# Patient Record
Sex: FEMALE | Race: WHITE | NOT HISPANIC OR LATINO | Employment: STUDENT | URBAN - METROPOLITAN AREA
[De-identification: names, ages, dates, MRNs, and addresses within clinical notes are randomized per-mention and may not be internally consistent; named-entity substitution may affect disease eponyms.]

---
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
## 2020-02-26 NOTE — PROGRESS NOTES
"  Physical Therapy Daily Treatment Note     Name: Vidya Harley  Clinic Number: [id number]    Therapy Diagnosis:   Encounter Diagnoses   Name Primary?    Pain and swelling of left knee     Quadriceps weakness      Physician: Meng Acuna MD    Visit Date: 1/4/2019  Physician Orders: PT Eval and Treat   Medical Diagnosis from Referral: chronic pain left knee; plica syndrome left knee, chondromalacia patellae left knee  S/P arthroscopy of left knee  Evaluation Date: 12/7/2018  Authorization Period Expiration: 12/31/18  Plan of Care Expiration: 2/1/19  Visit # / Visits authorized: 6/ 20      Time In: 8:00 AM  Time Out: 9:00 AM  Total Billable Time: 55 minutes    Precautions: Standard    Subjective     Pt reports: that she is w/o complaint today  No pain  She was compliant with home exercise program.  Response to previous treatment: no pain  Functional change: Jogging 1/2 mile without pain.    Pain: 0/10  Location: left knee      Objective     Vidya received therapeutic exercises to develop strength, endurance, ROM and flexibility for 40 minutes including:     Hamstring stretch 3x 30 seconds  Quad sets 20x NP  SLRs 2 lb 3/10 NP  LAQs 2lb 3/10 NP  Lateral step downs 6", x20  Lunges 25 ft x4 rotation with yellow ball  Wall sits 3x 30sec ea  Stationary bike level 3, 10 min, seat 6  Leg press 50 lb, 3/10, seat 6  Lateral step squat with green band 25ft x 4  Shuttle jumps 1 black band x20  Treadmill 3.6 MPH 1/2 mile  SLS ball toss on trampoline 30 each way     Pt received grade II patella/jt mobs x 10 min to L knee  Vidya received cold pack for 10 minutes to left knee.      Home Exercises Provided and Patient Education Provided     Education provided:   - Pt instructed to continue with quad strengthening exercise and ice.    Written Home Exercises Provided: Patient instructed to cont prior HEP.  Exercises were reviewed and Vidya was able to demonstrate them prior to the end of the session.  Vidya " demonstrated good  understanding of the education provided.     See EMR under Patient Instructions for exercises provided prior visit.    Assessment   Pt marky tx with progression of ther ex well, improved patella mobility with reps with mobs.   Full ROM left knee. 5/5 quad strength.  Walking no longer restricted by pain.  Able to jog 1/2 mile without pain.  Vidya is progressing well towards her goals.   Pt prognosis is Excellent.     Pt will continue to benefit from skilled outpatient physical therapy to address the deficits listed in the problem list box on initial evaluation, provide pt/family education and to maximize pt's level of independence in the home and community environment.     Pt's spiritual, cultural and educational needs considered and pt agreeable to plan of care and goals.    Anticipated barriers to physical therapy: none    Goals:   Short Term Goals: 4 weeks   Pt will have full ROM without pain left knee to allow for unrestricted walking. MET 12/12/18  Pt will have 4+/5 quad strength and good quad tone for unlimited walking. MET 12/19/18  Pt will have no swelling of left knee. MET 1/2/19     Long Term Goals: 8 weeks   Pt will be able to walk 1 mile without sx.MET 12/19/18  Pt will be able to run 1 mile without sx. PROGRESSING    Plan     Cont per POC with focus on quad strengthening and hamstring stretching.    Tj Gabriel, PTA   Crescentic Advancement Flap Text: The defect edges were debeveled with a #15 scalpel blade.  Given the location of the defect and the proximity to free margins a crescentic advancement flap was deemed most appropriate.  Using a sterile surgical marker, the appropriate advancement flap was drawn incorporating the defect and placing the expected incisions within the relaxed skin tension lines where possible.    The area thus outlined was incised deep to adipose tissue with a #15 scalpel blade.  The skin margins were undermined to an appropriate distance in all directions utilizing iris scissors.

## 2020-06-15 DIAGNOSIS — Z30.46 ENCOUNTER FOR SURVEILLANCE OF IMPLANTABLE SUBDERMAL CONTRACEPTIVE: Primary | ICD-10-CM

## 2020-07-15 ENCOUNTER — OFFICE VISIT (OUTPATIENT)
Dept: OBSTETRICS AND GYNECOLOGY | Facility: CLINIC | Age: 20
End: 2020-07-15
Payer: COMMERCIAL

## 2020-07-15 VITALS
DIASTOLIC BLOOD PRESSURE: 60 MMHG | WEIGHT: 186.31 LBS | SYSTOLIC BLOOD PRESSURE: 110 MMHG | BODY MASS INDEX: 36.58 KG/M2 | HEIGHT: 60 IN

## 2020-07-15 DIAGNOSIS — Z30.46 NEXPLANON REMOVAL: ICD-10-CM

## 2020-07-15 DIAGNOSIS — Z01.812 PRE-PROCEDURE LAB EXAM: Primary | ICD-10-CM

## 2020-07-15 DIAGNOSIS — Z30.017 NEXPLANON INSERTION: ICD-10-CM

## 2020-07-15 LAB
B-HCG UR QL: NEGATIVE
CTP QC/QA: YES

## 2020-07-15 PROCEDURE — 99999 PR PBB SHADOW E&M-EST. PATIENT-LVL III: ICD-10-PCS | Mod: PBBFAC,,, | Performed by: SPECIALIST

## 2020-07-15 PROCEDURE — 11983 REMOVE/INSERT DRUG IMPLANT: CPT | Mod: S$GLB,,, | Performed by: SPECIALIST

## 2020-07-15 PROCEDURE — 99999 PR PBB SHADOW E&M-EST. PATIENT-LVL III: CPT | Mod: PBBFAC,,, | Performed by: SPECIALIST

## 2020-07-15 PROCEDURE — 3008F BODY MASS INDEX DOCD: CPT | Mod: CPTII,S$GLB,, | Performed by: SPECIALIST

## 2020-07-15 PROCEDURE — 11983 PR REMOVAL W/ REINSERT DRUG IMPLANT DEVICE: ICD-10-PCS | Mod: S$GLB,,, | Performed by: SPECIALIST

## 2020-07-15 PROCEDURE — 3008F PR BODY MASS INDEX (BMI) DOCUMENTED: ICD-10-PCS | Mod: CPTII,S$GLB,, | Performed by: SPECIALIST

## 2020-07-15 NOTE — PROCEDURES
Procedures       Procedure  Nexplanon Removal     After informed consent, betadine prep over implant site followed by 1% Lidocaine administered subQ over implant site. Small incision made over implant and mosquito hemostats used to grasp and remove implant w/o difficulty.        Procedure  Nexplanon Placement    After proper informed conscent, nondominant upper inner arm prepped with Betadine solution, in sterile fashion. 1% Lidocaine administered sub-Q along placement path.  Nexplanon delivery needle placed under skin, advanced to hub base and delivery lever depressed with implant delivered without difficulty.  Implant palpated under skin and solitary 4-0 suture placed and compression bandage applied. Tolerated well.   Pt to RTO 7 days for suture removal

## 2020-07-15 NOTE — PROGRESS NOTES
19 yo WF presents for  Nexplanon removal and replacement.  UPT neg    Past Medical History:   Diagnosis Date    Migraine        Past Surgical History:   Procedure Laterality Date    ARTHROSCOPY OF KNEE Left 2018    Procedure: ARTHROSCOPY, KNEE;  Surgeon: Meng Acuna MD;  Location: [facility] OR;  Service: Orthopedics;  Laterality: Left;    KNEE ARTHROSCOPY W/ PLICA EXCISION Left 2018    Procedure: EXCISION, PLICA, KNEE, ARTHROSCOPIC;  Surgeon: Meng Acuna MD;  Location: [facility] OR;  Service: Orthopedics;  Laterality: Left;    THUMB SURGERY Right        Family History   Problem Relation Age of Onset    No Known Problems Mother     No Known Problems Father     Breast cancer Neg Hx        Social History     Socioeconomic History    Marital status: Single     Spouse name: Not on file    Number of children: Not on file    Years of education: Not on file    Highest education level: Not on file   Occupational History    Not on file   Social Needs    Financial resource strain: Not on file    Food insecurity     Worry: Not on file     Inability: Not on file    Transportation needs     Medical: Not on file     Non-medical: Not on file   Tobacco Use    Smoking status: Never Smoker    Smokeless tobacco: Never Used   Substance and Sexual Activity    Alcohol use: No    Drug use: No    Sexual activity: Never     Birth control/protection: Implant   Lifestyle    Physical activity     Days per week: Not on file     Minutes per session: Not on file    Stress: Not on file   Relationships    Social connections     Talks on phone: Not on file     Gets together: Not on file     Attends [employer] service: Not on file     Active member of club or organization: Not on file     Attends meetings of clubs or organizations: Not on file     Relationship status: Not on file   Other Topics Concern    Not on file   Social History Narrative    Not on file       Current Outpatient Medications    Medication Sig Dispense Refill    etonogestrel (NEXPLANON) 68 mg Impl 68 mg by Subdermal route.       Current Facility-Administered Medications   Medication Dose Route Frequency Provider Last Rate Last Dose    etonogestrel Impl   Subdermal Once Jerome Otto MD           Review of patient's allergies indicates:   Allergen Reactions    Penicillins Hives    Zithromax [azithromycin] Hives       Review of System:   General: no chills, fever, night sweats, weight gain or weight loss  Psychological: no depression or suicidal ideation  Breasts: no new or changing breast lumps, nipple discharge or masses.  Respiratory: no cough, shortness of breath, or wheezing  Cardiovascular: no chest pain or dyspnea on exertion  Gastrointestinal: no abdominal pain, change in bowel habits, or black or bloody stools  Genito-Urinary: no incontinence, urinary frequency/urgency or vulvar/vaginal symptoms, pelvic pain or abnormal vaginal bleeding.  Musculoskeletal: no gait disturbance or muscular weakness    Procedure  Nexplanon Removal     After informed consent, betadine prep over implant site followed by 1% Lidocaine administered subQ over implant site. Small incision made over implant and mosquito hemostats used to grasp and remove implant w/o difficulty.        Procedure  Nexplanon Placement    After proper informed conscent, nondominant upper inner arm prepped with Betadine solution, in sterile fashion. 1% Lidocaine administered sub-Q along placement path.  Nexplanon delivery needle placed under skin, advanced to hub base and delivery lever depressed with implant delivered without difficulty.  Implant palpated under skin and solitary 4-0 suture placed and compression bandage applied. Tolerated well.   Pt to RTO 7 days for suture removal  Product card given to pt.

## 2020-10-05 ENCOUNTER — PATIENT MESSAGE (OUTPATIENT)
Dept: ADMINISTRATIVE | Facility: HOSPITAL | Age: 20
End: 2020-10-05

## 2021-01-04 ENCOUNTER — PATIENT MESSAGE (OUTPATIENT)
Dept: ADMINISTRATIVE | Facility: HOSPITAL | Age: 21
End: 2021-01-04

## 2021-02-01 ENCOUNTER — HOSPITAL ENCOUNTER (OUTPATIENT)
Dept: RADIOLOGY | Facility: HOSPITAL | Age: 21
Discharge: HOME | End: 2021-02-01
Attending: PHYSICAL MEDICINE & REHABILITATION
Payer: COMMERCIAL

## 2021-02-01 ENCOUNTER — OFFICE VISIT (OUTPATIENT)
Dept: ORTHOPEDICS | Facility: CLINIC | Age: 21
End: 2021-02-01
Payer: COMMERCIAL

## 2021-02-01 DIAGNOSIS — M25.561 PAIN AND SWELLING OF KNEE, RIGHT: ICD-10-CM

## 2021-02-01 DIAGNOSIS — M25.569 KNEE PAIN, UNSPECIFIED CHRONICITY, UNSPECIFIED LATERALITY: Primary | ICD-10-CM

## 2021-02-01 DIAGNOSIS — S83.8X1A MENISCAL INJURY, RIGHT, INITIAL ENCOUNTER: ICD-10-CM

## 2021-02-01 DIAGNOSIS — M25.569 KNEE PAIN, UNSPECIFIED CHRONICITY, UNSPECIFIED LATERALITY: ICD-10-CM

## 2021-02-01 DIAGNOSIS — S83.006A PATELLAR DISLOCATION, INITIAL ENCOUNTER: Primary | ICD-10-CM

## 2021-02-01 DIAGNOSIS — M25.461 PAIN AND SWELLING OF KNEE, RIGHT: ICD-10-CM

## 2021-02-01 PROCEDURE — 99213 OFFICE O/P EST LOW 20 MIN: CPT | Mod: S$GLB,,, | Performed by: PHYSICAL MEDICINE & REHABILITATION

## 2021-02-01 PROCEDURE — 73562 X-RAY EXAM OF KNEE 3: CPT | Mod: 26,LT,, | Performed by: RADIOLOGY

## 2021-02-01 PROCEDURE — 73564 X-RAY EXAM KNEE 4 OR MORE: CPT | Mod: TC,RT

## 2021-02-01 PROCEDURE — 73564 XR KNEE ORTHO RIGHT WITH FLEXION: ICD-10-PCS | Mod: 26,RT,, | Performed by: RADIOLOGY

## 2021-02-01 PROCEDURE — 99999 PR PBB SHADOW E&M-EST. PATIENT-LVL III: ICD-10-PCS | Mod: PBBFAC,,, | Performed by: PHYSICAL MEDICINE & REHABILITATION

## 2021-02-01 PROCEDURE — 99213 PR OFFICE/OUTPT VISIT, EST, LEVL III, 20-29 MIN: ICD-10-PCS | Mod: S$GLB,,, | Performed by: PHYSICAL MEDICINE & REHABILITATION

## 2021-02-01 PROCEDURE — 73564 X-RAY EXAM KNEE 4 OR MORE: CPT | Mod: 26,RT,, | Performed by: RADIOLOGY

## 2021-02-01 PROCEDURE — 99999 PR PBB SHADOW E&M-EST. PATIENT-LVL III: CPT | Mod: PBBFAC,,, | Performed by: PHYSICAL MEDICINE & REHABILITATION

## 2021-02-01 PROCEDURE — 1125F AMNT PAIN NOTED PAIN PRSNT: CPT | Mod: S$GLB,,, | Performed by: PHYSICAL MEDICINE & REHABILITATION

## 2021-02-01 PROCEDURE — 1125F PR PAIN SEVERITY QUANTIFIED, PAIN PRESENT: ICD-10-PCS | Mod: S$GLB,,, | Performed by: PHYSICAL MEDICINE & REHABILITATION

## 2021-02-01 PROCEDURE — 73562 XR KNEE ORTHO RIGHT WITH FLEXION: ICD-10-PCS | Mod: 26,LT,, | Performed by: RADIOLOGY

## 2021-02-08 ENCOUNTER — HOSPITAL ENCOUNTER (OUTPATIENT)
Dept: RADIOLOGY | Facility: HOSPITAL | Age: 21
Discharge: HOME | End: 2021-02-08
Attending: PHYSICAL MEDICINE & REHABILITATION
Payer: COMMERCIAL

## 2021-02-08 DIAGNOSIS — S83.8X1A MENISCAL INJURY, RIGHT, INITIAL ENCOUNTER: ICD-10-CM

## 2021-02-08 DIAGNOSIS — S83.006A PATELLAR DISLOCATION, INITIAL ENCOUNTER: ICD-10-CM

## 2021-02-08 DIAGNOSIS — M25.461 PAIN AND SWELLING OF KNEE, RIGHT: ICD-10-CM

## 2021-02-08 DIAGNOSIS — M25.561 PAIN AND SWELLING OF KNEE, RIGHT: ICD-10-CM

## 2021-02-08 PROCEDURE — 73721 MRI KNEE WITHOUT CONTRAST RIGHT: ICD-10-PCS | Mod: 26,RT,, | Performed by: RADIOLOGY

## 2021-02-08 PROCEDURE — 73721 MRI JNT OF LWR EXTRE W/O DYE: CPT | Mod: TC,RT

## 2021-02-08 PROCEDURE — 73721 MRI JNT OF LWR EXTRE W/O DYE: CPT | Mod: 26,RT,, | Performed by: RADIOLOGY

## 2021-02-09 ENCOUNTER — PATIENT MESSAGE (OUTPATIENT)
Dept: ORTHOPEDICS | Facility: CLINIC | Age: 21
End: 2021-02-09

## 2021-02-11 DIAGNOSIS — S83.006A PATELLAR DISLOCATION, INITIAL ENCOUNTER: Primary | ICD-10-CM

## 2021-02-11 DIAGNOSIS — M25.461 PAIN AND SWELLING OF KNEE, RIGHT: ICD-10-CM

## 2021-02-11 DIAGNOSIS — M25.561 PAIN AND SWELLING OF KNEE, RIGHT: ICD-10-CM

## 2021-02-19 ENCOUNTER — PATIENT MESSAGE (OUTPATIENT)
Dept: ORTHOPEDICS | Facility: CLINIC | Age: 21
End: 2021-02-19

## 2021-07-07 ENCOUNTER — PATIENT MESSAGE (OUTPATIENT)
Dept: ADMINISTRATIVE | Facility: HOSPITAL | Age: 21
End: 2021-07-07

## 2022-09-15 ENCOUNTER — TELEPHONE (OUTPATIENT)
Dept: SPORTS MEDICINE | Facility: CLINIC | Age: 22
End: 2022-09-15
Payer: COMMERCIAL

## 2022-09-15 NOTE — TELEPHONE ENCOUNTER
LVM for pt to call us back if she would like to r/s with PCSM provider. I briefly explained that she is scheduled with Dr. Hudson, one of our surgeons. If she is not interested in surgery at this time, we can r/s for PCSM.

## 2022-09-19 ENCOUNTER — PATIENT MESSAGE (OUTPATIENT)
Dept: ORTHOPEDICS | Facility: CLINIC | Age: 22
End: 2022-09-19
Payer: COMMERCIAL

## 2022-09-19 DIAGNOSIS — S83.006A PATELLAR DISLOCATION, INITIAL ENCOUNTER: Primary | ICD-10-CM

## 2022-09-19 DIAGNOSIS — M25.561 PAIN AND SWELLING OF KNEE, RIGHT: ICD-10-CM

## 2022-09-19 DIAGNOSIS — M25.461 PAIN AND SWELLING OF KNEE, RIGHT: ICD-10-CM

## 2022-09-21 ENCOUNTER — HOSPITAL ENCOUNTER (OUTPATIENT)
Dept: RADIOLOGY | Facility: HOSPITAL | Age: 22
Discharge: HOME | End: 2022-09-21
Attending: ORTHOPAEDIC SURGERY
Payer: COMMERCIAL

## 2022-09-21 ENCOUNTER — OFFICE VISIT (OUTPATIENT)
Dept: ORTHOPEDICS | Facility: CLINIC | Age: 22
End: 2022-09-21
Payer: COMMERCIAL

## 2022-09-21 VITALS — HEIGHT: 60 IN | WEIGHT: 186 LBS | BODY MASS INDEX: 36.52 KG/M2

## 2022-09-21 DIAGNOSIS — M22.2X1 PATELLOFEMORAL PAIN SYNDROME OF RIGHT KNEE: ICD-10-CM

## 2022-09-21 DIAGNOSIS — S83.004S PATELLAR DISLOCATION, RIGHT, SEQUELA: Primary | ICD-10-CM

## 2022-09-21 DIAGNOSIS — S83.006A PATELLAR DISLOCATION, INITIAL ENCOUNTER: ICD-10-CM

## 2022-09-21 DIAGNOSIS — M25.561 PAIN AND SWELLING OF KNEE, RIGHT: ICD-10-CM

## 2022-09-21 DIAGNOSIS — M25.461 PAIN AND SWELLING OF KNEE, RIGHT: ICD-10-CM

## 2022-09-21 PROCEDURE — 1159F MED LIST DOCD IN RCRD: CPT | Mod: CPTII,S$GLB,, | Performed by: ORTHOPAEDIC SURGERY

## 2022-09-21 PROCEDURE — 73564 XR KNEE ORTHO BILAT WITH FLEXION: ICD-10-PCS | Mod: 26,,, | Performed by: RADIOLOGY

## 2022-09-21 PROCEDURE — 99204 PR OFFICE/OUTPT VISIT, NEW, LEVL IV, 45-59 MIN: ICD-10-PCS | Mod: S$GLB,,, | Performed by: ORTHOPAEDIC SURGERY

## 2022-09-21 PROCEDURE — 99999 PR PBB SHADOW E&M-EST. PATIENT-LVL III: CPT | Mod: PBBFAC,,, | Performed by: ORTHOPAEDIC SURGERY

## 2022-09-21 PROCEDURE — 3008F PR BODY MASS INDEX (BMI) DOCUMENTED: ICD-10-PCS | Mod: CPTII,S$GLB,, | Performed by: ORTHOPAEDIC SURGERY

## 2022-09-21 PROCEDURE — 99204 OFFICE O/P NEW MOD 45 MIN: CPT | Mod: S$GLB,,, | Performed by: ORTHOPAEDIC SURGERY

## 2022-09-21 PROCEDURE — 73564 X-RAY EXAM KNEE 4 OR MORE: CPT | Mod: TC,50

## 2022-09-21 PROCEDURE — 99999 PR PBB SHADOW E&M-EST. PATIENT-LVL III: ICD-10-PCS | Mod: PBBFAC,,, | Performed by: ORTHOPAEDIC SURGERY

## 2022-09-21 PROCEDURE — 73564 X-RAY EXAM KNEE 4 OR MORE: CPT | Mod: 26,,, | Performed by: RADIOLOGY

## 2022-09-21 PROCEDURE — 1159F PR MEDICATION LIST DOCUMENTED IN MEDICAL RECORD: ICD-10-PCS | Mod: CPTII,S$GLB,, | Performed by: ORTHOPAEDIC SURGERY

## 2022-09-21 PROCEDURE — 3008F BODY MASS INDEX DOCD: CPT | Mod: CPTII,S$GLB,, | Performed by: ORTHOPAEDIC SURGERY

## 2022-09-21 NOTE — PROGRESS NOTES
Patient ID: Vidya Harley  YOB: 2000  MRN: [id number]    Chief Complaint: Pain and Swelling of the Right Knee      Referred By: Pt continuing care from Dr. Lynn    History of Present Illness: Vidya Harley is a 22 y.o. 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 Symptom onset was a year and a half ago without any known injury. She notes that her mom has had the same issue. The pain is located on the Lateral Aspect of her Right Knee and has worsened with time. She states that the subluxations occur every few months. She reports she has not fully dislocated since May. The patient has been previously seen for this issue by Dr. Socorro Lynn on 2/1/2021. Treatment included x-rays, 6 months of PT, MRI, TROM knee brace, and crutches. She reports she has not been to PT since may of last year. She has also tried KT Tape and Tylenol with mild relief. Aggravating activities include going up stairs, getting off of a horse, prolonged standing (>1 hour), bending, and squatting. She likes riding horses     HPI    Past Medical History:   Past Medical History:   Diagnosis Date    Migraine      Past Surgical History:   Procedure Laterality Date    ARTHROSCOPY OF KNEE Left 11/20/2018    Procedure: ARTHROSCOPY, KNEE;  Surgeon: Meng Acuna MD;  Location: [facility] OR;  Service: Orthopedics;  Laterality: Left;    KNEE ARTHROSCOPY W/ PLICA EXCISION Left 11/20/2018    Procedure: EXCISION, PLICA, KNEE, ARTHROSCOPIC;  Surgeon: Meng Acuna MD;  Location: [facility] OR;  Service: Orthopedics;  Laterality: Left;    THUMB SURGERY Right      Family History   Problem Relation Age of Onset    No Known Problems Mother     No Known Problems Father     Breast cancer Neg Hx      Social History     Socioeconomic History    Marital status: Single   Tobacco Use     Smoking status: Never    Smokeless tobacco: Never   Substance and Sexual Activity    Alcohol use: No    Drug use: No    Sexual activity: Never     Birth control/protection: Implant     Medication List with Changes/Refills   Current Medications    ETONOGESTREL (NEXPLANON) 68 MG IMPL    68 mg by Subdermal route.     Review of patient's allergies indicates:   Allergen Reactions    Penicillins Hives    Zithromax [azithromycin] Hives     ROS    Physical Exam:   Body mass index is 36.33 kg/m².  There were no vitals filed for this visit.   GENERAL: Well appearing, appropriate for stated age, no acute distress.  CARDIOVASCULAR: Pulses regular by peripheral palpation.  PULMONARY: Respirations are even and non-labored.  NEURO: Awake, alert, and oriented x 3.  PSYCH: Mood & affect are appropriate.  HEENT: Head is normocephalic and atraumatic.            Right Knee Exam     Tenderness   The patient is tender to palpation of the lateral retinaculum.    Range of Motion   Extension:  0   Flexion:  90     Tests   Ligament Examination   Lachman: normal (-1 to 2mm)   PCL-Posterior Drawer: normal (0 to 2mm)     MCL - Valgus: normal (0 to 2mm)  LCL - Varus: normal  Patella   Patellar apprehension: negative  Patellar Glide (quadrants): Lateral - 2   Medial - 1  J-Sign: present    Other   Sensation: normal    Comments:  Intact EHL, FHL, gastrocsoleus, and tibialis anterior. Sensation intact to light touch in superficial peroneal, deep peroneal, tibial, sural, and saphenous nerve distributions. Foot warm and well perfused with capillary refill of less than 2 seconds and palpable pedal pulses.      Muscle Strength   Right Lower Extremity   Hip Abduction: 5/5   Quadriceps:  4/5   Hamstrin/5     Vascular Exam     Right Pulses  Dorsalis Pedis:      2+  Posterior Tibial:      2+        Imaging:    X-ray Knee Ortho Bilateral with Flexion  Narrative: EXAMINATION:  XR KNEE ORTHO BILAT WITH FLEXION    CLINICAL HISTORY:  Unspecified  dislocation of unspecified patella, initial encounter    TECHNIQUE:  AP standing of both knees, PA flexion standing views of both knees, and Merchant views of both knees were performed.  Lateral views of both knees were also performed.    COMPARISON  02/01/2021    FINDINGS:  The joint spaces of all 3 compartments of both knees appear to be well maintained.  No significant joint effusions are suggested.  No acute fracture or dislocation.  Impression: 1.  As above    Electronically signed by: Conrad Matson DO  Date:    09/21/2022  Time:    10:01    MRI Knee Without Contrast Right  Order: [id number]  Status: Final result     Visible to patient: Yes (seen)     Next appt: None     Dx: Meniscal injury, right, initial encou...     1 Result Note  Details    Reading Physician Reading Date Result Priority   Elías Stevenson MD  [phone] 2/9/2021 Routine     Narrative & Impression  EXAMINATION:  MRI KNEE WITHOUT CONTRAST RIGHT     CLINICAL HISTORY:  Meniscus tear;possible patellar dislocation;Unspecified dislocation of unspecified patella, initial encounter     TECHNIQUE:  Multiplanar, multisequence images were performed about the knee.     COMPARISON:  None     FINDINGS:  Menisci:     --Medial: Intact     --Lateral: Intact     Ligaments:  ACL, PCL, MCL, and LCL complex are intact.     Tendons:  Quadriceps and patellar tendons are intact.  There is lateral patella subluxation and tilt with minimal faint edema signal present within the medial patella.  More prevalent but still relatively mild edema noted within the lateral femoral condyle.  Minimal edema signal involving the medial retinaculum without definite complete disruption.     Cartilage:     Patellofemoral: Articular cartilage is maintained.     Medial tibiofemoral: Articular cartilage is maintained.     Lateral tibiofemoral: Articular cartilage is maintained.     Bone: No fracture with osseous contusions as above.     Miscellaneous: No significant effusion      Impression:     Findings of patellar dislocation - relocation.  There remains lateral patellar subluxation tilt.             Relevant imaging results reviewed and interpreted by me, discussed with the patient and / or family today.     Other Tests:               Patient Instructions   Assessment:  Vidya Harley is a  22 y.o. female [facility] [employer]  with a chief complaint of Pain and Swelling of the Right Knee    TTT  Patellofemoral pain of right knee  Chronic right knee dislocations    Encounter Diagnoses   Name Primary?    Patellar dislocation, right, sequela Yes    Patellofemoral pain syndrome of right knee       Plan:  Order PT at [facility] - 2-3x per week for 6-8 weeks   Patella stabilizer brace     Follow-up: 6 weeks or sooner if there are any problems between now and then.    Leave Review:   Google: Leave Google Review  Healthgrades: Leave Healthgrades Review    After Hours Number: [phone]      Provider Note/Medical Decision Making:  Old notes and records reviewed including old imaging reviewed and interpreted by me and surgical records as well as prior provider visits.  Various treatment options discussed at length      I discussed worrisome and red flag signs and symptoms with the patient. The patient expressed understanding and agreed to alert me immediately or to go to the emergency room if they experience any of these.   Treatment plan was developed with input from the patient/family, and they expressed understanding and agreement with the plan. All questions were answered today.          Mustapha Hudson MD  Orthopaedic Surgery & Sports Medicine       Disclaimer: This note was prepared using a voice recognition system and is likely to have sound alike errors within the text.     I, Maliha Retana, acted as a scribe for Mustapha Hudson MD for the duration of this office visit.

## 2022-09-21 NOTE — PATIENT INSTRUCTIONS
Assessment:  Vidya Harley is a  22 y.o. female [facility] [employer]  with a chief complaint of Pain and Swelling of the Right Knee    TTT  Patellofemoral pain of right knee  Chronic right knee dislocations    Encounter Diagnoses   Name Primary?    Patellar dislocation, right, sequela Yes    Patellofemoral pain syndrome of right knee       Plan:  Order PT at [facility] - 2-3x per week for 6-8 weeks   Patella stabilizer brace     Follow-up: 6 weeks or sooner if there are any problems between now and then.    Leave Review:   Google: Leave Google Review  Healthgrades: Leave Healthgrades Review    After Hours Number: [phone]

## 2022-09-21 NOTE — PROGRESS NOTES
Patient ID: Vidya Harley  YOB: 2000  MRN: [id number]    Chief Complaint: Pain and Swelling of the Right Knee      Referred By: Pt continuing care from Dr. 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 Symptom onset was a year and a half ago without any known injury. She notes that her mom has had the same issue. The pain is located on the Lateral Aspect of her Right Knee and has worsened with time. She states that the dislocations occur every few months. The patient has been previously seen for this issue by Dr. Socorro Lynn on 2/1/2021. Treatment included x-rays, 6 months of PT, MRI, knee brace, and crutches. She has also tried KT Tape and Tylenol with mild relief. Aggravating activities include going up stairs, getting off of a horse, prolonged standing (>1 hour), bending, and squatting.    HPI    Past Medical History:   Past Medical History:   Diagnosis Date    Migraine      Past Surgical History:   Procedure Laterality Date    ARTHROSCOPY OF KNEE Left 11/20/2018    Procedure: ARTHROSCOPY, KNEE;  Surgeon: Meng Acuna MD;  Location: [facility] OR;  Service: Orthopedics;  Laterality: Left;    KNEE ARTHROSCOPY W/ PLICA EXCISION Left 11/20/2018    Procedure: EXCISION, PLICA, KNEE, ARTHROSCOPIC;  Surgeon: Meng Acuna MD;  Location: [facility] OR;  Service: Orthopedics;  Laterality: Left;    THUMB SURGERY Right      Family History   Problem Relation Age of Onset    No Known Problems Mother     No Known Problems Father     Breast cancer Neg Hx      Social History     Socioeconomic History    Marital status: Single   Tobacco Use    Smoking status: Never    Smokeless tobacco: Never   Substance and Sexual Activity    Alcohol use: No    Drug use: No    Sexual activity:  Never     Birth control/protection: Implant     Medication List with Changes/Refills   Current Medications    ETONOGESTREL (NEXPLANON) 68 MG IMPL    68 mg by Subdermal route.     Review of patient's allergies indicates:   Allergen Reactions    Penicillins Hives    Zithromax [azithromycin] Hives     ROS    Physical Exam:   There is no height or weight on file to calculate BMI.  There were no vitals filed for this visit.   GENERAL: Well appearing, appropriate for stated age, no acute distress.  CARDIOVASCULAR: Pulses regular by peripheral palpation.  PULMONARY: Respirations are even and non-labored.  NEURO: Awake, alert, and oriented x 3.  PSYCH: Mood & affect are appropriate.  HEENT: Head is normocephalic and atraumatic.  Ortho/SPM Exam  ***    Imaging:    X-ray Knee Ortho Bilateral with Flexion  Narrative: EXAMINATION:  XR KNEE ORTHO BILAT WITH FLEXION    CLINICAL HISTORY:  Unspecified dislocation of unspecified patella, initial encounter    TECHNIQUE:  AP standing of both knees, PA flexion standing views of both knees, and Merchant views of both knees were performed.  Lateral views of both knees were also performed.    COMPARISON  02/01/2021    FINDINGS:  The joint spaces of all 3 compartments of both knees appear to be well maintained.  No significant joint effusions are suggested.  No acute fracture or dislocation.  Impression: 1.  As above    Electronically signed by: Conrad Matson DO  Date:    09/21/2022  Time:    10:01    ***  Relevant imaging results reviewed and interpreted by me, discussed with the patient and / or family today. ***    Other Tests:     ***    There are no Patient Instructions on file for this visit.  Provider Note/Medical Decision Making: ***      I discussed worrisome and red flag signs and symptoms with the patient. The patient expressed understanding and agreed to alert me immediately or to go to the emergency room if they experience any of these.   Treatment plan was developed with  input from the patient/family, and they expressed understanding and agreement with the plan. All questions were answered today.          Mustapha Hudson MD  Orthopaedic Surgery & Sports Medicine       Disclaimer: This note was prepared using a voice recognition system and is likely to have sound alike errors within the text.

## (undated) DEVICE — MAT QUICK 40X30 FLOOR FLUID LF

## (undated) DEVICE — GLOVE BIOGEL SZ 8 1/2

## (undated) DEVICE — TOURNIQUET SB QC DP 30X4IN

## (undated) DEVICE — PUMP COLD THERAPY

## (undated) DEVICE — NEPTUNE 4 PORT MANIFOLD

## (undated) DEVICE — SEE MEDLINE ITEM 146313

## (undated) DEVICE — BLADE SURG CARBON STEEL SZ11

## (undated) DEVICE — PAD ABD 8X10 STERILE

## (undated) DEVICE — BANDAGE ESMARK 6X12

## (undated) DEVICE — SEE MEDLINE ITEM 157216

## (undated) DEVICE — DRAPE STERI U-SHAPED 47X51IN

## (undated) DEVICE — SOL IRR NACL .9% 3000ML

## (undated) DEVICE — LINER GLOVE POWDERFREE SZ 8.5

## (undated) DEVICE — BANDAGE ACE ELASTIC 6"

## (undated) DEVICE — SEE MEDLINE ITEM 146292

## (undated) DEVICE — APPLICATOR CHLORAPREP ORN 26ML

## (undated) DEVICE — BLADE SHAVER GREAT WHITE 3.5MM

## (undated) DEVICE — SLEEVE SCD EXPRESS CALF MEDIUM

## (undated) DEVICE — ELECTRODE REM PLYHSV RETURN 9

## (undated) DEVICE — Device

## (undated) DEVICE — SEE MEDLINE ITEM 152530

## (undated) DEVICE — GAUZE SPONGE 4X4 12PLY

## (undated) DEVICE — PAD COLD THERAPY KNEE WRAP ON

## (undated) DEVICE — DRAPE PLASTIC U 60X72

## (undated) DEVICE — CLOSURE SKIN STERI STRIP 1/2X4

## (undated) DEVICE — SEE MEDLINE ITEM 146298

## (undated) DEVICE — SEE MEDLINE ITEM 157131

## (undated) DEVICE — NDL SPINAL 18GX3.5 SPINOCAN

## (undated) DEVICE — GOWN SURG 2XL DISP TIE BACK